# Patient Record
Sex: FEMALE | Race: BLACK OR AFRICAN AMERICAN | NOT HISPANIC OR LATINO | URBAN - METROPOLITAN AREA
[De-identification: names, ages, dates, MRNs, and addresses within clinical notes are randomized per-mention and may not be internally consistent; named-entity substitution may affect disease eponyms.]

---

## 2023-07-10 ENCOUNTER — APPOINTMENT (RX ONLY)
Dept: URBAN - METROPOLITAN AREA CLINIC 41 | Facility: CLINIC | Age: 33
Setting detail: DERMATOLOGY
End: 2023-07-10

## 2023-07-10 DIAGNOSIS — L65.8 OTHER SPECIFIED NONSCARRING HAIR LOSS: ICD-10-CM | Status: INADEQUATELY CONTROLLED

## 2023-07-10 PROCEDURE — ? PRESCRIPTION MEDICATION MANAGEMENT

## 2023-07-10 PROCEDURE — 99203 OFFICE O/P NEW LOW 30 MIN: CPT

## 2023-07-10 PROCEDURE — ? COUNSELING

## 2023-07-10 PROCEDURE — ? ADDITIONAL NOTES

## 2023-07-10 NOTE — PROCEDURE: PRESCRIPTION MEDICATION MANAGEMENT
Render In Strict Bullet Format?: No
Detail Level: Zone
Plan: Treatment Number: 1\\nLesions Injected: 1\\nMedication Injected: 10.0 mg/cc of Kenalog\\nTotal Volume Injected: 0.25cc\\nAdministered by: Dr. Carlo Blum\\n\\nThe risks of atrophy and possible hypo/hyperpigmentation were reviewed with the patient.

## 2023-07-10 NOTE — HPI: HAIR LOSS
Additional History: New Pt has noticed a patch of hair loss on right temple 4 months ago. Pt tried vivascul for months with no improvement.

## 2023-07-10 NOTE — PROCEDURE: COUNSELING
Detail Level: Detailed
Patient Specific Counseling (Will Not Stick From Patient To Patient): -\\nBG states this area looks more like thinning or traction alopecia instead of areata. BG states we can inject it if she wants or just monitor it. Pt would like to inject.